# Patient Record
Sex: MALE | Race: WHITE | NOT HISPANIC OR LATINO
[De-identification: names, ages, dates, MRNs, and addresses within clinical notes are randomized per-mention and may not be internally consistent; named-entity substitution may affect disease eponyms.]

---
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
## 2023-01-11 ENCOUNTER — APPOINTMENT (OUTPATIENT)
Dept: PEDIATRIC ORTHOPEDIC SURGERY | Facility: CLINIC | Age: 18
End: 2023-01-11
Payer: COMMERCIAL

## 2023-01-11 PROCEDURE — 99213 OFFICE O/P EST LOW 20 MIN: CPT

## 2023-01-11 NOTE — END OF VISIT
[FreeTextEntry3] : \par Saw and examined patient and agree with plan with modifications.\par \par Mariah Muniz MD\par [facility]\par Pediatric Orthopedic Surgery\par

## 2023-01-11 NOTE — ASSESSMENT
[FreeTextEntry1] : 18 yr old male 2.5 yrs s/p ACL and meniscus repair on 8/18/2020, 2.5 years ago. He is doing very well.\par \par  The condition, natural history, and prognosis were explained to the patient . The clinical findings  were reviewed with the patient. Clinically he is doing well; he has no pain or discomfort and has full ROM. No signs of re-injury of his ACL. Mild thigh and calf asymmetry noted. Recommendation at this time for custom L knee  ACL brace to be worn during sporting activities. [employer] will measure that brace during today's visit and deliver this shortly. Follow up in 1 year then as needed.\par \par All questions answered. Patient expressed understanding and agreement with the above.\par \par \par I, Edie De Guzman , have acted as a scribe and documented the above information for Dr. Muniz.\par \par \par \par

## 2023-01-11 NOTE — REVIEW OF SYSTEMS
[Change in Activity] : no change in activity [Fever Above 102] : no fever [Sore Throat] : no sore throat [Murmur] : no murmur

## 2023-01-11 NOTE — REASON FOR VISIT
[Follow Up] : a follow up visit [Patient] : patient [FreeTextEntry1] : s/p ACL and meniscus repair on 8/18/2020.

## 2023-01-11 NOTE — PHYSICAL EXAM
[FreeTextEntry1] : Gait: Patient ambulated independently in the exam room without any limp. Good coordination and balance noted.\par GENERAL: alert, cooperative, in NAD\par SKIN: The skin is intact, warm, pink and dry over the area examined.\par EYES: Normal conjunctiva, normal eyelids and pupils were equal and round.\par ENT: normal ears, normal nose and normal lips.\par CARDIOVASCULAR: brisk capillary refill, but no peripheral edema.\par RESPIRATORY: The patient is in no apparent respiratory distress. They're taking full deep breaths without use of accessory muscles or evidence of audible wheezes or stridor without the use of a stethoscope. Normal respiratory effort.\par ABDOMEN: not examined\par \par L knee \par Incision well healed \par Knee ROM: Full\par No swelling noted over knee joint globally \par sensation intact \par EHL/FHL/GS/TA intact\par capillary refill <2 seconds\par no lymphedema \par + mild quad and calf asymmetry R>L\par Able to toe and heel walk and single leg hop and jog without difficulty
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
## 2023-02-11 ENCOUNTER — APPOINTMENT (OUTPATIENT)
Dept: PEDIATRICS | Facility: CLINIC | Age: 18
End: 2023-02-11
Payer: COMMERCIAL

## 2023-02-11 VITALS — OXYGEN SATURATION: 98 % | TEMPERATURE: 98 F

## 2023-02-11 LAB — S PYO AG SPEC QL IA: ABNORMAL

## 2023-02-11 PROCEDURE — 99214 OFFICE O/P EST MOD 30 MIN: CPT

## 2023-02-11 PROCEDURE — 87880 STREP A ASSAY W/OPTIC: CPT | Mod: QW

## 2023-02-11 NOTE — DISCUSSION/SUMMARY
[FreeTextEntry1] : 18 year male with strep pharyngitis. Complete amoxicillin as prescribed. Use antipyretics as needed for fever and/or pain.  Encourage fluids and rest. Change toothbrush after 24-48 hours.  Return to office if symptoms worsen or do not improve.\par \par Refill for inhaler sent to pharmacy. Not currently wheezing or experiencing asthma symptoms. URI/cold weather last week triggered it but he seems to have recovered.\par \par Will make CPE appointment.\par \par Total time dedicated to this patient's visit includes preparing to see patient (reviewing chart, any pertinent labs/consults, etc.), obtaining and/or reviewing separately obtained history from patient and parent, performing medical examination, evaluation, counseling and educating patient/parent, ordering any needed medications and/or labs, documenting clinical information in the electronic record, reviewing any results subsequent to the orders placed during visit and discussing with patient/parent.\par Total time spent: 30 minutes.

## 2023-02-25 ENCOUNTER — APPOINTMENT (OUTPATIENT)
Dept: PEDIATRICS | Facility: CLINIC | Age: 18
End: 2023-02-25

## 2023-03-11 ENCOUNTER — APPOINTMENT (OUTPATIENT)
Dept: PEDIATRICS | Facility: CLINIC | Age: 18
End: 2023-03-11
Payer: COMMERCIAL

## 2023-03-11 VITALS
BODY MASS INDEX: 26.75 KG/M2 | DIASTOLIC BLOOD PRESSURE: 70 MMHG | WEIGHT: 178.56 LBS | HEART RATE: 58 BPM | TEMPERATURE: 97 F | HEIGHT: 68.5 IN | SYSTOLIC BLOOD PRESSURE: 118 MMHG | RESPIRATION RATE: 18 BRPM

## 2023-03-11 DIAGNOSIS — S02.609A FRACTURE OF MANDIBLE, UNSPECIFIED, INITIAL ENCOUNTER FOR CLOSED FRACTURE: ICD-10-CM

## 2023-03-11 DIAGNOSIS — S83.512A SPRAIN OF ANTERIOR CRUCIATE LIGAMENT OF LEFT KNEE, INITIAL ENCOUNTER: ICD-10-CM

## 2023-03-11 DIAGNOSIS — S83.281A OTHER TEAR OF LATERAL MENISCUS, CURRENT INJURY, RIGHT KNEE, INITIAL ENCOUNTER: ICD-10-CM

## 2023-03-11 DIAGNOSIS — Z87.81 PERSONAL HISTORY OF (HEALED) TRAUMATIC FRACTURE: ICD-10-CM

## 2023-03-11 DIAGNOSIS — Z87.09 PERSONAL HISTORY OF OTHER DISEASES OF THE RESPIRATORY SYSTEM: ICD-10-CM

## 2023-03-11 DIAGNOSIS — M25.562 PAIN IN LEFT KNEE: ICD-10-CM

## 2023-03-11 DIAGNOSIS — L92.9 GRANULOMATOUS DISORDER OF THE SKIN AND SUBCUTANEOUS TISSUE, UNSPECIFIED: ICD-10-CM

## 2023-03-11 DIAGNOSIS — S40.012A CONTUSION OF LEFT SHOULDER, INITIAL ENCOUNTER: ICD-10-CM

## 2023-03-11 PROCEDURE — 90471 IMMUNIZATION ADMIN: CPT

## 2023-03-11 PROCEDURE — 92551 PURE TONE HEARING TEST AIR: CPT

## 2023-03-11 PROCEDURE — 96160 PT-FOCUSED HLTH RISK ASSMT: CPT | Mod: 59

## 2023-03-11 PROCEDURE — 90651 9VHPV VACCINE 2/3 DOSE IM: CPT

## 2023-03-11 PROCEDURE — 96127 BRIEF EMOTIONAL/BEHAV ASSMT: CPT

## 2023-03-11 PROCEDURE — 99395 PREV VISIT EST AGE 18-39: CPT | Mod: 25

## 2023-03-11 RX ORDER — FLUTICASONE PROPIONATE 110 UG/1
110 AEROSOL, METERED RESPIRATORY (INHALATION) DAILY
Qty: 1 | Refills: 2 | Status: DISCONTINUED | COMMUNITY
Start: 2019-04-03 | End: 2023-03-11

## 2023-03-11 RX ORDER — AMOXICILLIN 500 MG/1
500 TABLET, FILM COATED ORAL
Qty: 20 | Refills: 0 | Status: DISCONTINUED | COMMUNITY
Start: 2023-02-11 | End: 2023-03-11
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
## 2023-03-11 NOTE — DISCUSSION/SUMMARY
[FreeTextEntry1] : 18 year male here for well visit. Normal growth and development observed. Recommend nutritious, balanced diet with all food groups. Brush teeth twice daily and recommend visiting dentist twice per year for cleaning. Maintain consistent daily routines and sleep schedule. Personal hygiene, puberty, and sexual health reviewed. Risky behaviors assessed. School progress discussed. Limit screen time to less than 2 hours per day. Encourage physical activity: recommend at least 60 minutes daily.  Return 1 year for routine well visit. \par \par Counselled extensively on vaping/juuling and substance use (marijuana and alcohol) in regards to his overall health especially considering his asthma.\par \par Vaccine Information Sheet(s) given for appropriate vaccines. The components of the vaccine(s) to be administered today are listed in the plan of care. We discussed common side effects and education on the vaccine was provided including the disease(s) for which the vaccine(s) are intended to prevent as well as any risks. Denies any questions. Consent was given to vaccinate. HPV #2 of 3 given by JERRY Neff.\par \par GARRY substance screening administered and scanned into chart. Counseling provided. \par \par Routine labs requested. He requests STD testing as well, no symptoms. Advised future condom use every single encounter.
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
## 2023-03-13 LAB
APPEARANCE: CLEAR
BACTERIA: NEGATIVE
BASOPHILS # BLD AUTO: 0.03 K/UL
BASOPHILS NFR BLD AUTO: 0.5 %
BILIRUBIN URINE: NEGATIVE
BLOOD URINE: NEGATIVE
C TRACH RRNA SPEC QL NAA+PROBE: NOT DETECTED
CHOLEST SERPL-MCNC: 136 MG/DL
COLOR: YELLOW
EOSINOPHIL # BLD AUTO: 0.23 K/UL
EOSINOPHIL NFR BLD AUTO: 4.1 %
GLUCOSE QUALITATIVE U: NEGATIVE
HCT VFR BLD CALC: 48.4 %
HDLC SERPL-MCNC: 60 MG/DL
HGB BLD-MCNC: 15.6 G/DL
HIV1+2 AB SPEC QL IA.RAPID: NONREACTIVE
HSV1-DNA: NOT DETECTED
HSV2-DNA: NOT DETECTED
HYALINE CASTS: 1 /LPF
IMM GRANULOCYTES NFR BLD AUTO: 0.5 %
KETONES URINE: NEGATIVE
LDLC SERPL CALC-MCNC: 63 MG/DL
LEUKOCYTE ESTERASE URINE: NEGATIVE
LYMPHOCYTES # BLD AUTO: 1.59 K/UL
LYMPHOCYTES NFR BLD AUTO: 28.3 %
MAN DIFF?: NORMAL
MCHC RBC-ENTMCNC: 29.4 PG
MCHC RBC-ENTMCNC: 32.2 GM/DL
MCV RBC AUTO: 91.3 FL
MICROSCOPIC-UA: NORMAL
MONOCYTES # BLD AUTO: 0.45 K/UL
MONOCYTES NFR BLD AUTO: 8 %
N GONORRHOEA RRNA SPEC QL NAA+PROBE: NOT DETECTED
NEUTROPHILS # BLD AUTO: 3.28 K/UL
NEUTROPHILS NFR BLD AUTO: 58.6 %
NITRITE URINE: NEGATIVE
NONHDLC SERPL-MCNC: 77 MG/DL
PH URINE: 6
PLATELET # BLD AUTO: 270 K/UL
PROTEIN URINE: NORMAL
RBC # BLD: 5.3 M/UL
RBC # FLD: 12.1 %
RED BLOOD CELLS URINE: 1 /HPF
SOURCE AMPLIFICATION: NORMAL
SPECIFIC GRAVITY URINE: 1.03
SQUAMOUS EPITHELIAL CELLS: 0 /HPF
T PALLIDUM AB SER QL IA: NEGATIVE
TRIGL SERPL-MCNC: 68 MG/DL
UROBILINOGEN URINE: NORMAL
WBC # FLD AUTO: 5.61 K/UL
WHITE BLOOD CELLS URINE: 0 /HPF

## 2023-05-02 NOTE — ASU PREOPERATIVE ASSESSMENT, PEDIATRIC([facility] ONLY) - BILL OF RIGHT
This was a shared visit with the ERNIE. I reviewed and verified the documentation and independently performed the documented:
yes

## 2023-06-22 ENCOUNTER — NON-APPOINTMENT (OUTPATIENT)
Age: 18
End: 2023-06-22

## 2023-07-05 NOTE — HISTORY OF PRESENT ILLNESS
[FreeTextEntry6] : 18 yr old male present for asthma check. Afebrile. He had a URI last week that triggered his asthma. He used his inhaler for about 2 days. At one point he used it "6 puffs in a row" because 2 was not helping him for longer than 30 mins. He says his last asthma attack was over a year ago he thinks. \par \par Also had a sore throat last night.\par \par Overdue to have a physical, last CPE was 7/2020 pleural effusion

## 2023-08-04 ENCOUNTER — APPOINTMENT (OUTPATIENT)
Dept: PULMONOLOGY | Facility: CLINIC | Age: 18
End: 2023-08-04
Payer: COMMERCIAL

## 2023-08-04 VITALS
HEART RATE: 66 BPM | HEIGHT: 68.5 IN | BODY MASS INDEX: 26.67 KG/M2 | WEIGHT: 178 LBS | SYSTOLIC BLOOD PRESSURE: 130 MMHG | DIASTOLIC BLOOD PRESSURE: 69 MMHG | OXYGEN SATURATION: 94 %

## 2023-08-04 DIAGNOSIS — J45.909 UNSPECIFIED ASTHMA, UNCOMPLICATED: ICD-10-CM

## 2023-08-04 DIAGNOSIS — Z80.3 FAMILY HISTORY OF MALIGNANT NEOPLASM OF BREAST: ICD-10-CM

## 2023-08-04 DIAGNOSIS — Z78.9 OTHER SPECIFIED HEALTH STATUS: ICD-10-CM

## 2023-08-04 PROCEDURE — 99204 OFFICE O/P NEW MOD 45 MIN: CPT

## 2023-08-07 ENCOUNTER — RX RENEWAL (OUTPATIENT)
Age: 18
End: 2023-08-07

## 2023-08-07 RX ORDER — BUDESONIDE AND FORMOTEROL FUMARATE DIHYDRATE 160; 4.5 UG/1; UG/1
160-4.5 AEROSOL RESPIRATORY (INHALATION) TWICE DAILY
Qty: 30.6 | Refills: 0 | Status: ACTIVE | COMMUNITY
Start: 2023-08-07 | End: 1900-01-01

## 2023-09-02 ENCOUNTER — NON-APPOINTMENT (OUTPATIENT)
Age: 18
End: 2023-09-02

## 2023-09-02 PROBLEM — Z78.9 ELECTRONIC CIGARETTE USE: Status: ACTIVE | Noted: 2023-03-11

## 2023-09-02 PROBLEM — Z80.3 FAMILY HISTORY OF BREAST CANCER: Status: ACTIVE | Noted: 2023-08-04

## 2023-09-02 NOTE — CONSULT LETTER
[Dear  ___] : Dear  [unfilled], [Consult Letter:] : I had the pleasure of evaluating your patient, [unfilled]. [Please see my note below.] : Please see my note below. [Consult Closing:] : Thank you very much for allowing me to participate in the care of this patient.  If you have any questions, please do not hesitate to contact me. [Sincerely,] : Sincerely, [FreeTextEntry2] : Dr. Allison Jain [FreeTextEntry3] : Ghulam Grijalva MD Attending Physician in Pulmonary & Critical Care Medicine  of [facility] [employer] [facility]
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
## 2024-05-22 ENCOUNTER — APPOINTMENT (OUTPATIENT)
Dept: PEDIATRICS | Facility: CLINIC | Age: 19
End: 2024-05-22

## 2024-05-22 VITALS — TEMPERATURE: 97.7 F

## 2024-05-22 DIAGNOSIS — Z11.1 ENCOUNTER FOR SCREENING FOR RESPIRATORY TUBERCULOSIS: ICD-10-CM

## 2024-05-29 LAB
M TB IFN-G BLD-IMP: NEGATIVE
QUANTIFERON TB PLUS MITOGEN MINUS NIL: >10 IU/ML
QUANTIFERON TB PLUS NIL: 0.05 IU/ML
QUANTIFERON TB PLUS TB1 MINUS NIL: 0.01 IU/ML
QUANTIFERON TB PLUS TB2 MINUS NIL: 0.02 IU/ML

## 2024-06-02 ENCOUNTER — NON-APPOINTMENT (OUTPATIENT)
Age: 19
End: 2024-06-02

## 2024-06-03 ENCOUNTER — LABORATORY RESULT (OUTPATIENT)
Age: 19
End: 2024-06-03

## 2024-06-03 ENCOUNTER — APPOINTMENT (OUTPATIENT)
Dept: PEDIATRICS | Facility: CLINIC | Age: 19
End: 2024-06-03
Payer: COMMERCIAL

## 2024-06-03 VITALS
DIASTOLIC BLOOD PRESSURE: 74 MMHG | RESPIRATION RATE: 18 BRPM | HEART RATE: 90 BPM | BODY MASS INDEX: 30.5 KG/M2 | SYSTOLIC BLOOD PRESSURE: 120 MMHG | HEIGHT: 68.5 IN | WEIGHT: 203.56 LBS | TEMPERATURE: 98.1 F

## 2024-06-03 DIAGNOSIS — Z23 ENCOUNTER FOR IMMUNIZATION: ICD-10-CM

## 2024-06-03 DIAGNOSIS — Z00.00 ENCOUNTER FOR GENERAL ADULT MEDICAL EXAMINATION W/OUT ABNORMAL FINDINGS: ICD-10-CM

## 2024-06-03 PROCEDURE — 92551 PURE TONE HEARING TEST AIR: CPT

## 2024-06-03 PROCEDURE — 90715 TDAP VACCINE 7 YRS/> IM: CPT

## 2024-06-03 PROCEDURE — 90471 IMMUNIZATION ADMIN: CPT

## 2024-06-03 PROCEDURE — 96160 PT-FOCUSED HLTH RISK ASSMT: CPT | Mod: 59

## 2024-06-03 PROCEDURE — 99395 PREV VISIT EST AGE 18-39: CPT | Mod: 25

## 2024-06-03 RX ORDER — ALBUTEROL SULFATE 90 UG/1
108 (90 BASE) INHALANT RESPIRATORY (INHALATION)
Qty: 1 | Refills: 2 | Status: ACTIVE | COMMUNITY
Start: 2019-04-03 | End: 1900-01-01
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
## 2024-06-03 NOTE — DISCUSSION/SUMMARY
[FreeTextEntry1] : This is a 20 yo  adolescent male here for routine exam and immunizations. The patient shows good growth and development from previous exam last year. Patient has gained weight however he has been working out and has developed a lot of muscle mass.  He is a healthy eater no junk food.  Physical exam is within normal limits.   Immunizations were discussed.  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 Healthy diet was discussed at length and discussed importance of exercise and healthy lifestyle.   Patient is to return in 1 year for routine exam and immunizations.

## 2024-06-04 ENCOUNTER — NON-APPOINTMENT (OUTPATIENT)
Age: 19
End: 2024-06-04

## 2024-06-04 LAB
HBV SURFACE AB SER QL: NONREACTIVE
HBV SURFACE AG SER QL: NONREACTIVE
HCV AB SER QL: NONREACTIVE
HCV S/CO RATIO: 0.15 S/CO
MEV IGG FLD QL IA: 68.9 AU/ML
MEV IGG+IGM SER-IMP: POSITIVE
MUV AB SER-ACNC: NEGATIVE
MUV IGG SER QL IA: <5 AU/ML
RUBV IGG FLD-ACNC: 0.4 INDEX
RUBV IGG SER-IMP: NEGATIVE

## 2024-06-05 ENCOUNTER — APPOINTMENT (OUTPATIENT)
Dept: PEDIATRICS | Facility: CLINIC | Age: 19
End: 2024-06-05
Payer: COMMERCIAL

## 2024-06-05 PROCEDURE — 90744 HEPB VACC 3 DOSE PED/ADOL IM: CPT

## 2024-06-05 PROCEDURE — 90472 IMMUNIZATION ADMIN EACH ADD: CPT

## 2024-06-05 PROCEDURE — 90707 MMR VACCINE SC: CPT

## 2024-06-05 PROCEDURE — 90471 IMMUNIZATION ADMIN: CPT

## 2024-06-05 NOTE — HISTORY OF PRESENT ILLNESS
[Hepatitis B] : Hepatitis B [MMR] : MMR [FreeTextEntry1] : blood work  for school showed NO IMMUNITY  to HEP B  or Mumps/ Rubella needs full vaccine series for both -  see labs/ note on 6/4/24  NO fever

## 2024-06-05 NOTE — DISCUSSION/SUMMARY
[FreeTextEntry1] : Return after 4 weeks for booster of MMR and HEP  B #2 [] : The components of the vaccine(s) to be administered today are listed in the plan of care. The disease(s) for which the vaccine(s) are intended to prevent and the risks have been discussed with the caretaker.  The risks are also included in the appropriate vaccination information statements which have been provided to the patient's caregiver.  The caregiver has given consent to vaccinate.

## 2024-06-06 LAB
DEPRECATED SHRIMP IGE RAST QL: 2 (ref 0–?)
SCALLOP IGE QN: 1.09 KUA/L

## 2024-06-10 NOTE — SPEAKING VALVE EVALUATION - DIAGNOSTIC IMPRESSIONS
Patient seen for passy-em speaking valve evaluation. Patient maintained on RA. Low pressure valve placed on the hub of the trach.  Patient's vocal quality WNL. Patient tolerated valve. VSS stable throughout assessment. No signs of respiratoy distress. No increased work of breathing. No subjective complaints. No signs of air trapping. Valve removed after 30 minutes. Pt's mother trained re: placement and removal of PMV with adequate teach back/demonstration.  Education provided re: when to use valve and contraindications - pt/mother verbalized understanding and teach back. 2 (mild pain)

## 2024-08-15 NOTE — H&P ADULT - I WAS PHYSICALLY PRESENT FOR THE KEY PORTIONS OF THE EVALUATION AND MANAGEMENT (E/M) SERVICE PROVIDED.  I AGREE WITH THE ABOVE HISTORY, PHYSICAL, AND PLAN WHICH I HAVE REVIEWED AND EDITED WHERE APPROPRIATE
Followed by neurologist Dr Payan  Monitor cognitive status  Monitor for falls  Continue close supervision and assist with ADLs  Continues on Sertraline 50 mg in the AM & 100 mg in the PM  Continues on Mirtazapine 30 mg nightly  Has 24/7 caregiver support    Statement Selected

## 2025-03-30 NOTE — ASU PREOPERATIVE ASSESSMENT, PEDIATRIC(IPARK ONLY) - HISTORIAN
Return to the emergency department for any new or worsening symptoms including, but not limited to, signs of dehydration (no tear production, decreased urine output), signs of difficulty breathing (flaring of the nostrils, retractions, increased belly breathing), vomiting after every feed/drink, or fever for more than 5 consecutive days.   mother

## 2025-04-19 ENCOUNTER — NON-APPOINTMENT (OUTPATIENT)
Age: 20
End: 2025-04-19

## 2025-05-22 ENCOUNTER — NON-APPOINTMENT (OUTPATIENT)
Age: 20
End: 2025-05-22